# Patient Record
(demographics unavailable — no encounter records)

---

## 2024-10-24 NOTE — HISTORY OF PRESENT ILLNESS
[de-identified] : Mr. Lorenzo is a 74 year old man with multiple myeloma.  He was diagnosed in February 2016 when he had back pain. He was eventually noted in May 2016 to have a large sacral lesion with biopsy demonstrating plasma cell neoplasm. He underwent 20 Gy in 5 fractions to the area in May 2016. He continued to have pain and underwent repeat radiation to 21 Gy in 3 fractions with SBRT completed in August 2016.  He last followed up with us on 11/2/17 reporting continued right lower back pain radiating to the right leg on methadone and hydromorphone. Plan was for repeat lumbosacral MRI and follow-up.  MRI Lumbar spine on 11/14/17 demonstrated mild progression of severe left sided foraminal narrowing at L5-S1 and unchanged appearance of large infiltrative mass within the sacrum. 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  In February he underwent an L2-pelvis posterior spinal fusion with Dr. Alvarez after progressive left lower extremity radiculopathy and he elected for lumbopelvic reconstruction. Pathology of the L5-S1 extradural mass was disc cartilage with mild degenerative changes. He followed up with Dr. Alvarez last on 4/30/18 doing well.  He has been following with Dr. Tan of medical oncology who has been treating her with zometa and maintenance relvimid.  His most recent PET scan on 4/16/18 demonstrates new hypermetabolism associated with pathologic left sacral fracture, a predominantly non-FDG avid sacral lesion unchanges, and nonspecific hypermetabolism associated with new orthopedic hardware in lumbar/sacral spine.  Patient underwent an AutoPSCT on 4/02/19. Engraftment was noted on 4/12/19. Discharged on 4/19/19. Hospital Course: Mr. Lorenzo is a 66 year old male with oligosecretory kappa light chain multiple myeloma initially diagnosed 2/2016, involving a large sacral lesion with biopsy proven plasma cell neoplasm. He underwent radiation with SBRT completed in 8/2016. A subsequent MRI showed mild progression of severe left sided foraminal narrowing at L5-S1 and an unchanged appearance of a large infiltrative mass within the sacrum.   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 In 2/2018 he had an L2 pelvis posterior spimal fusion and elected for lumbopelvic reconstruction. Pathology of the L5-S1 extradural mass was disc cartilage with mild degenerative changes. He then followed with Dr. Tan of medical oncology, and was treated with zometa and maintenance revlimid. A PET scan on 4/16/18 showed a new hypermetabolism associated with a pathologic left sacral fracture, a predominantly  non-FDG avid sacral lesion that was changed, and a nonspecific hypermetabolism associated with new orthopedic hardware in the lumbar / sacral spine. The most recent CT from 12/2018 showed disease in the pelvis related to myeloma.   Labs prior to admission showed hepatitis B core antibody positive, however hepatitis B PCR negative. His most recent SPEP, quant Ig and SFLC were unremarkable. IgG 1140.   Upon admission, a TLC was placed in IR. Mr. Lorenzo received IV hydration, pain management, anxiolysis, antiemetics, nutritional support and antibacterial / antifungal / antiviral / PCP / GI and VOD (SOS) prophylaxis. When his ANC dropped below 1000, he was started on prophylactic ciprofloxacin.   On 4/2/19, after pre-medication, Mr. Lorenzo received 313 mLs of pooled, thawed, mobilized, plasma reduced, washed apheresis HPC over 49 minutes. Cell counts as follows:   Total MNCs ( x 10^8/kg): 3.80  CD 34+Cells ( x 10^6/kg): 7.10  Cell Viability (%): 81  Mr. Lorenzo tolerated the infusion well with no adverse reactions noted.   On admission, Mr. Lorenzo had baseline mild left foot drop. He worked without physical therapy throughout his admission. Mr. Lorenzo also had diarrhea. A sample was sent for c. difficile testing, and was positive. He was treated with a course of oral vancomycin. Mr. Lorenzo also experienced pancytopenia related to the high dose chemotherapy preparative regimen.   Engraftment was noted on 4/12/19, and the ciprofloxacin and zarxio were stopped. He did experience a low grade fever during the katie-engraftment period and was observed off of antibiotics.   Currently, Mr. Lorenzo is stable for discharge home with out patient follow p at the [facility].  [de-identified] : Patient presents today s/p AutoPSCT on 4/02/19. Overall, he is doing well today. He c/o neuropathy in bilateral legs with the right leg having more neuropathy more than the left. He continues to experience a foot drop. and discomfort in left hip  .  PET/CT 9/25/2019, showed continued resolution in the sacral area, lumbar spine and in the pelvis which  indicates continued healing in the bones.  Remains compliant with medications. Denies night sweats, mouth sores, eye dryness, blurred vision, nausea, vomiting, diarrhea. No CP, SOB or LE edema..not on rev   11/30/23:  Patient is here for a follow up visit. Denies fever, chills, nausea, vomiting, diarrhea, rash, mouth sores or any signs of active bleeding. Denies SOB, chest pain or B/L LE edema. Complaints of neuropathy, hasn't improved. Patient follows up with PCP regularly, has to watch his sugar. Takes Metamucil.  3/6/24:  Patient is here for a follow up visit. Denies fever, chills, nausea, vomiting, diarrhea, rash, mouth sores or any signs of active bleeding. Denies SOB, chest pain or B/L LE edema. Complains of neuropathy.    7/25/24: Patient is here for a follow up visit. Denies fever, chills, nausea, vomiting, diarrhea, rash, mouth sores or any signs of active bleeding. Denies SOB, chest pain, or B/L LE edema. Pt states that his radiation went well and was completed on 7/22/24.   10.24.24: Patient is here for a follow up visit. Denies fever, chills, nausea, vomiting, diarrhea, rash, mouth sores or any signs of active bleeding. Denies SOB, chest pain or B/L LE edema. Completed xrt

## 2024-10-24 NOTE — REVIEW OF SYSTEMS
[Joint Pain] : joint pain [Negative] : Allergic/Immunologic [Fatigue] : no fatigue [Cough] : no cough [FreeTextEntry9] : occ muscle cramping [de-identified] : stable neuropathy and numbness in feet, foot drop continues

## 2024-10-24 NOTE — RESULTS/DATA
[FreeTextEntry1] : 04/23/24: PET/CT FDG Whole Body ONC Subsequent Treatment Strategy: IMPRESSION: 1. Since the PET/CT in 9/2019; new FDG avid lytic lesion in the right 5th rib with soft tissue component compatible with active multiple myeloma. 2. Relatively stable chronic bone changes in the sacrum and adjacent bilateral iliac bones with nonspecific patches of activity most likely due to continuing bone remodeling.

## 2024-10-24 NOTE — ASSESSMENT
[FreeTextEntry1] : DVT (deep venous thrombosis) (453.40) (I82.409) History of autologous stem cell transplant (V42.82) (Z94.84) Multiple myeloma (203.00) (C90.00) This is a 74 year old male with PMH of oligo-secretory kappa light chain multiple myeloma, s/p AUTO PBSCT on 4/2/19 with the following comorbidities being managed  1) MM S/p auto SCT on 4/2/19..appeared to have good disease control Repeated myeloma labs approx every 4 mos after SCT (Sept 2019) Restaging PET scan to be done approx 4 to 6 mos after SCT done Sept 2019..confirmed improvement..repeated late winter....overall good disease control..not on maintenance due to sxs...skeletal survey ordered 8/4/22..stable Repeat CT/PET due Spring / summer 2024...4/23/24 results compatible with active MM...local radiation completed 7/22/24 f/u neuro and ortho for low back issues....??role for kyphoplasty 11/2023 24 hr urine-stable..repeat today  2) Heme Counts stable, no indication for transfusion Continue multivitamin daily. Discontinue folic acid as of 7/16/19. 10/24/24: WBC 3.37, HGB 12.7,   3) ID - Acyclovir 400mg q8hr for shingles prevention..off - Mepron 750mg bid- discontinued Fluconazole discontinued. Reviewed infectious risk and post-transplant crowd and dietary restrictions. All restrictions lifted on 5/31/19. Covid caution stressed  Hx of C Diff infection during transplant admission Diarrhea resolved PO Vanco 125mg decreased to bid on 5/2/19, then discontinued.  Intermittent dysuria 5/2/19 UA negative Urine cx negative  4) GI Continue Protonix 40mg daily PRN Reglan and Zofran as needed  Transaminitis First noted 4/25/19 with AST of 89 and ALT of 249 Fluconazole- d/c'd 4/26 with transaminitis Improved as of 5/2/19, continue to monitor Restarted Fluconazole....now discontinue once current supply complete. Discontinued.  Discussed re-vaccination schedule. Starting april 2020..postponed due to crisis with covid 19..plan for august 12 month vaccines (hemophilus influenza B, Prevnar 13, inactivated polio, tetanus-diphtheria) 14 month vaccines (hemophilus influenza B, inactivated polio, tetanus-diphtheria) oct 2020 24 month vaccines (hemophilus influenza B, pneumococcal vaccine, inactivated polio, tetanus-diphtheria, measles, mumps, rubella). completed  5) Plan/Dispo Reviewed infectious risk and post-transplant crowd and dietary restrictions with covid. Continue to monitor IgG levels  maintenance again discussed...will hold off until neuropathic sx improve..otc topicals discussed  Also discussed potential treatment if POD...natural hx of MM discussed Pt educated regarding plan of care, all questions/concerns addressed. Instructed to contact our office with fever or new/worsening symptoms.  Recommend voltaren cream in area of pain on hip Well care and cancer screening stressed Chest CT mid-October 2024 with improvement in radiated lesion.... 24 hr urine brought by pt today Discussed consideration of Darzalex and Revlimid if pt does not respond to local radiation and requires systemic treatment Discussed beginning 5-10mg dosage of Revlimid for maintenance   Discussed consideration of repeat BMB  Discussed bispecific antibody treatment if the myeloma progresses  pt received covid vaccine X 3 moderna...and flu shot last year..flu advised today as well as covid booster  Follow up with Dr. Mtz in 3 months

## 2024-10-24 NOTE — PHYSICAL EXAM
[Restricted in physically strenuous activity but ambulatory and able to carry out work of a light or sedentary nature] : Status 1- Restricted in physically strenuous activity but ambulatory and able to carry out work of a light or sedentary nature, e.g., light house work, office work [Normal] : affect appropriate [de-identified] : lungs clear to auscultation bilaterally upon exam  [de-identified] : generalized hyperpigmentation improved

## 2024-10-24 NOTE — ADDENDUM
[FreeTextEntry1] : Documented by Brown London acting as a scribe for Dr. Bhavani Mtz on 10/24/24. All medical record entries made by the Scribe were at my, Dr. Bhavani Mtz, direction and personally dictated by me on 10/24/24. I have reviewed the chart and agree that the record accurately reflects my personal performance of the history, physical exam, assessment and plan. I have also personally directed, reviewed, and agree with the discharge instructions.

## 2025-01-13 NOTE — HISTORY OF PRESENT ILLNESS
[FreeTextEntry1] : Mr. Lorenzo presents today for consideration for radiation therapy.  He is a 74 year old man with multiple myeloma diagnosed initially in 2016 after presenting with back pain.   CT abdomen and pelvis done 5/3/2016 showed a large destructive sacral soft tissue mass 6.5 X 10.5 X 8cm. A sacral FNA on 5/9/2016 showed morphologic findings and presence of a bright CD38 population support a plasma cell neoplasm.   Bone marrow biopsy 5/11/2016 showed atypical plasmacytosis consistent with plasma cell neoplasm. He completed 2000 cgy in 5 fractions to the sacrum under my care at from 5/12-5/18/2016.  He continued to have pain and completed repeat RT with SBRT to the sacrum, 2100 cgy in 3 fractions on 8/4/2016 He is s/p L2-pelvis posterior spinal fusion with Dr. Alvarez after progressive LLE radiculopathy. Path from L5-s1 extradural mass showed disc cartilage with mild degenerative changes.  Initially treated with Dr. Tan with revlemid starting 12/2016. Treated with Auto PSCT on 4/2/2019 with engraftment on 4/12/2019.  PET scan done 4/23/2024 showed: 1. Since the PET/CT in 9/2019; new FDG avid lytic lesion in the right 5th rib with soft tissue component compatible with active multiple myeloma. 2. Relatively stable chronic bone changes in the sacrum and adjacent bilateral iliac bones with nonspecific patches of activity most likely due to continuing bone remodeling.  Pathology from 5/22/2024 FNA of the right rib showed plasma cell neoplasm. Note: The current biopsy of the right rib lesion shows involvement by plasma cell neoplasm and is consistent with relapse. Suggest clinical correlation.  7/22/2024 Completed RT to chest wall total dose of 2500cGy in 5/5 fractions  9/19/24 CT Chest IMPRESSION: Right anterior fifth rib lytic lesion with soft tissue component, decreased since 4/23/2024. Stable rounded lucent lesions throughout the thoracic spine. Stable bilateral pulmonary nodules.  1/02/25 CT Chest IMPRESSION: Lucent lesion within the right fifth rib with slight interval decrease in size of its internal soft tissue component since September 19, 2024. 1.4 cm enlarged right internal mammary chain lymph node with significant increase in size since September 19, 2024. 1 cm anterior mediastinal soft tissue nodule with interval increase in size since September 19, 2024. Findings are likely due to progression of neoplasm. PET CT can be performed for further evaluation. Small patchy opacity within the right lower lobe new since September 19, 2024, may represent subsegmental atelectasis or be sequela of the given history of radiation therapy however, follow-up to resolution is recommended. Cluster of a few adjacent subcentimeter groundglass opacities within the right upper lobe new since September 19, 2024 likely of impacted distal airways may represent tiny foci of infection/inflammation. These can be monitored on the follow-up chest CTs.  1/13/25 Presents today for follow up. Denies fever, chills, nausea, vomiting, diarrhea, rash, mouth sores or any signs of active bleeding. Denies SOB, chest pain or B/L LE edema.  Continues follow up with Med/Onc Dr. Mtz on 10/24/24

## 2025-01-13 NOTE — DISEASE MANAGEMENT
[Clinical] : TNM Stage: c [N/A] : Currently not applicable [de-identified] : 4895 [de-identified] : 0697 [de-identified] : Chest wall [TTNM] : x [NTNM] : x [MTNM] : x

## 2025-01-13 NOTE — REVIEW OF SYSTEMS
[Negative] : Psychiatric [FreeTextEntry9] : notes pain to left hip since myeloma diagnosis. Denies pain to chest

## 2025-01-13 NOTE — DISEASE MANAGEMENT
[Clinical] : TNM Stage: c [N/A] : Currently not applicable [de-identified] : 4218 [de-identified] : 8476 [de-identified] : Chest wall [TTNM] : x [NTNM] : x [MTNM] : x

## 2025-02-12 NOTE — ASSESSMENT
[FreeTextEntry1] : DVT (deep venous thrombosis) (453.40) (I82.409) History of autologous stem cell transplant (V42.82) (Z94.84) Multiple myeloma (203.00) (C90.00) This is a 74 year old male with PMH of oligo-secretory kappa light chain multiple myeloma, s/p AUTO PBSCT on 4/2/19 with the following comorbidities being managed  1) MM S/p auto SCT on 4/2/19..appeared to have good disease control Repeated myeloma labs approx every 4 mos after SCT (Sept 2019) Restaging PET scan to be done approx 4 to 6 mos after SCT done Sept 2019..confirmed improvement..repeated late winter....overall good disease control..not on maintenance due to sxs...skeletal survey ordered 8/4/22..stable Repeat CT/PET due Spring / summer 2024...4/23/24 results compatible with active MM...local radiation completed 7/22/24..2/2025 ct/pet new lesion..discussed bx f/u neuro and ortho for low back issues....??role for kyphoplasty   2) Heme Counts stable, no indication for transfusion Continue multivitamin daily. Discontinue folic acid as of 7/16/19. 10/24/24: WBC 3.37, HGB 12.7,  02/12/25: WBC 3.10, HGB 12.5,   3) ID - Acyclovir 400mg q8hr for shingles prevention..off - Mepron 750mg bid- discontinued Fluconazole discontinued. Reviewed infectious risk and post-transplant crowd and dietary restrictions. All restrictions lifted on 5/31/19. Covid caution stressed  Hx of C Diff infection during transplant admission Diarrhea resolved PO Vanco 125mg decreased to bid on 5/2/19, then discontinued.  Intermittent dysuria 5/2/19 UA negative Urine cx negative  4) GI Continue Protonix 40mg daily PRN Reglan and Zofran as needed  Transaminitis First noted 4/25/19 with AST of 89 and ALT of 249 Fluconazole- d/c'd 4/26 with transaminitis Improved as of 5/2/19, continue to monitor Restarted Fluconazole....now discontinue once current supply complete. Discontinued.  Discussed re-vaccination schedule. Starting april 2020..postponed due to crisis with covid 19..plan for august 12 month vaccines (hemophilus influenza B, Prevnar 13, inactivated polio, tetanus-diphtheria) 14 month vaccines (hemophilus influenza B, inactivated polio, tetanus-diphtheria) oct 2020 24 month vaccines (hemophilus influenza B, pneumococcal vaccine, inactivated polio, tetanus-diphtheria, measles, mumps, rubella). completed  5) Plan/Dispo Reviewed infectious risk and post-transplant crowd and dietary restrictions with covid. Continue to monitor IgG levels  maintenance again discussed...will hold off until neuropathic sx improve..otc topicals discussed  Also discussed potential treatment if POD...natural hx of MM discussed Pt educated regarding plan of care, all questions/concerns addressed. Instructed to contact our office with fever or new/worsening symptoms.  Recommend voltaren cream in area of pain on hip Well care and cancer screening stressed Chest CT mid-October 2024 with improvement in radiated lesion.... ct/pet feb 2025 new lesion Discussed consideration of Darzalex and Revlimid if pt does not respond to local radiation and requires systemic treatment Discussed consideration of repeat BMB  Discussed bispecific antibody treatment if the myeloma progresses  pt received covid vaccine X 3 moderna...and flu shot last year..flu advised today as well as covid booster Discussed his most recent PET/CT results that noted a hypermetabolic  lymph node measuring 1.7cm suv 21 in the chest that raises a concern... discussed a needle biopsy to further assess   Follow up with Dr. Mtz in 4 weeks

## 2025-02-12 NOTE — PHYSICAL EXAM
[Restricted in physically strenuous activity but ambulatory and able to carry out work of a light or sedentary nature] : Status 1- Restricted in physically strenuous activity but ambulatory and able to carry out work of a light or sedentary nature, e.g., light house work, office work [Normal] : affect appropriate [de-identified] : lungs clear to auscultation bilaterally upon exam  [de-identified] : generalized hyperpigmentation improved

## 2025-02-12 NOTE — HISTORY OF PRESENT ILLNESS
[de-identified] : Mr. Lorenzo is a 74 year old man with multiple myeloma.  He was diagnosed in February 2016 when he had back pain. He was eventually noted in May 2016 to have a large sacral lesion with biopsy demonstrating plasma cell neoplasm. He underwent 20 Gy in 5 fractions to the area in May 2016. He continued to have pain and underwent repeat radiation to 21 Gy in 3 fractions with SBRT completed in August 2016.  He last followed up with us on 11/2/17 reporting continued right lower back pain radiating to the right leg on methadone and hydromorphone. Plan was for repeat lumbosacral MRI and follow-up.  MRI Lumbar spine on 11/14/17 demonstrated mild progression of severe left sided foraminal narrowing at L5-S1 and unchanged appearance of large infiltrative mass within the sacrum. 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  In February he underwent an L2-pelvis posterior spinal fusion with Dr. Alvarez after progressive left lower extremity radiculopathy and he elected for lumbopelvic reconstruction. Pathology of the L5-S1 extradural mass was disc cartilage with mild degenerative changes. He followed up with Dr. Alvarez last on 4/30/18 doing well.  He has been following with Dr. Tan of medical oncology who has been treating her with zometa and maintenance relvimid.  His most recent PET scan on 4/16/18 demonstrates new hypermetabolism associated with pathologic left sacral fracture, a predominantly non-FDG avid sacral lesion unchanges, and nonspecific hypermetabolism associated with new orthopedic hardware in lumbar/sacral spine.  Patient underwent an AutoPSCT on 4/02/19. Engraftment was noted on 4/12/19. Discharged on 4/19/19. Hospital Course: Mr. Lorenzo is a 66 year old male with oligosecretory kappa light chain multiple myeloma initially diagnosed 2/2016, involving a large sacral lesion with biopsy proven plasma cell neoplasm. He underwent radiation with SBRT completed in 8/2016. A subsequent MRI showed mild progression of severe left sided foraminal narrowing at L5-S1 and an unchanged appearance of a large infiltrative mass within the sacrum.   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 In 2/2018 he had an L2 pelvis posterior spimal fusion and elected for lumbopelvic reconstruction. Pathology of the L5-S1 extradural mass was disc cartilage with mild degenerative changes. He then followed with Dr. Tan of medical oncology, and was treated with zometa and maintenance revlimid. A PET scan on 4/16/18 showed a new hypermetabolism associated with a pathologic left sacral fracture, a predominantly  non-FDG avid sacral lesion that was changed, and a nonspecific hypermetabolism associated with new orthopedic hardware in the lumbar / sacral spine. The most recent CT from 12/2018 showed disease in the pelvis related to myeloma.   Labs prior to admission showed hepatitis B core antibody positive, however hepatitis B PCR negative. His most recent SPEP, quant Ig and SFLC were unremarkable. IgG 1140.   Upon admission, a TLC was placed in IR. Mr. Lorenzo received IV hydration, pain management, anxiolysis, antiemetics, nutritional support and antibacterial / antifungal / antiviral / PCP / GI and VOD (SOS) prophylaxis. When his ANC dropped below 1000, he was started on prophylactic ciprofloxacin.   On 4/2/19, after pre-medication, Mr. Lorenzo received 313 mLs of pooled, thawed, mobilized, plasma reduced, washed apheresis HPC over 49 minutes. Cell counts as follows:   Total MNCs ( x 10^8/kg): 3.80  CD 34+Cells ( x 10^6/kg): 7.10  Cell Viability (%): 81  Mr. Lorenzo tolerated the infusion well with no adverse reactions noted.   On admission, Mr. Lorenzo had baseline mild left foot drop. He worked without physical therapy throughout his admission. Mr. Lorenzo also had diarrhea. A sample was sent for c. difficile testing, and was positive. He was treated with a course of oral vancomycin. Mr. Lorenzo also experienced pancytopenia related to the high dose chemotherapy preparative regimen.   Engraftment was noted on 4/12/19, and the ciprofloxacin and zarxio were stopped. He did experience a low grade fever during the katie-engraftment period and was observed off of antibiotics.   Currently, Mr. Lorenzo is stable for discharge home with out patient follow p at the [facility].  [de-identified] : Patient presents today s/p AutoPSCT on 4/02/19. Overall, he is doing well today. He c/o neuropathy in bilateral legs with the right leg having more neuropathy more than the left. He continues to experience a foot drop. and discomfort in left hip  .  PET/CT 9/25/2019, showed continued resolution in the sacral area, lumbar spine and in the pelvis which  indicates continued healing in the bones.  Remains compliant with medications. Denies night sweats, mouth sores, eye dryness, blurred vision, nausea, vomiting, diarrhea. No CP, SOB or LE edema..not on rev   11/30/23:  Patient is here for a follow up visit. Denies fever, chills, nausea, vomiting, diarrhea, rash, mouth sores or any signs of active bleeding. Denies SOB, chest pain or B/L LE edema. Complaints of neuropathy, hasn't improved. Patient follows up with PCP regularly, has to watch his sugar. Takes Metamucil.  3/6/24:  Patient is here for a follow up visit. Denies fever, chills, nausea, vomiting, diarrhea, rash, mouth sores or any signs of active bleeding. Denies SOB, chest pain or B/L LE edema. Complains of neuropathy.    7/25/24: Patient is here for a follow up visit. Denies fever, chills, nausea, vomiting, diarrhea, rash, mouth sores or any signs of active bleeding. Denies SOB, chest pain, or B/L LE edema. Pt states that his radiation went well and was completed on 7/22/24.   10.24.24: Patient is here for a follow up visit. Denies fever, chills, nausea, vomiting, diarrhea, rash, mouth sores or any signs of active bleeding. Denies SOB, chest pain or B/L LE edema. Completed xrt  02.12.25: Patient is here for a follow up visit. Denies fever, chills, nausea, vomiting, diarrhea, rash, mouth sores or any signs of active bleeding. Denies SOB, chest pain or B/L LE edema. did ct/pet last week

## 2025-02-12 NOTE — REVIEW OF SYSTEMS
[Joint Pain] : joint pain [Negative] : Allergic/Immunologic [Fatigue] : no fatigue [Cough] : no cough [FreeTextEntry9] : occ muscle cramping [de-identified] : stable neuropathy and numbness in feet, foot drop continues

## 2025-02-12 NOTE — ADDENDUM
[FreeTextEntry1] : Documented by Brown London acting as a scribe for Dr. Bhavani Mtz on 02/12/2025. All medical record entries made by the Scribe were at my, Dr. Bhavani Mtz, direction and personally dictated by me on 02/12/2025. I have reviewed the chart and agree that the record accurately reflects my personal performance of the history, physical exam, assessment and plan. I have also personally directed, reviewed, and agree with the discharge instructions.

## 2025-03-21 NOTE — PHYSICAL EXAM
[Restricted in physically strenuous activity but ambulatory and able to carry out work of a light or sedentary nature] : Status 1- Restricted in physically strenuous activity but ambulatory and able to carry out work of a light or sedentary nature, e.g., light house work, office work [Normal] : affect appropriate [de-identified] : lungs clear to auscultation bilaterally upon exam  [de-identified] : generalized hyperpigmentation improved

## 2025-03-21 NOTE — ADDENDUM
[FreeTextEntry1] : Documented by Brown London acting as a scribe for Dr. Bhavani Mtz on 03/19/2025. All medical record entries made by the Scribe were at my, Dr. Bhavani Mtz, direction and personally dictated by me on 03/19/2025. I have reviewed the chart and agree that the record accurately reflects my personal performance of the history, physical exam, assessment and plan. I have also personally directed, reviewed, and agree with the discharge instructions.

## 2025-03-21 NOTE — REVIEW OF SYSTEMS
[Joint Pain] : joint pain [Negative] : Allergic/Immunologic [Fatigue] : no fatigue [Cough] : no cough [FreeTextEntry9] : occ muscle cramping [de-identified] : stable neuropathy and numbness in feet, foot drop continues

## 2025-03-21 NOTE — HISTORY OF PRESENT ILLNESS
[de-identified] : Mr. Lorenzo is a 74 year old man with multiple myeloma.  He was diagnosed in February 2016 when he had back pain. He was eventually noted in May 2016 to have a large sacral lesion with biopsy demonstrating plasma cell neoplasm. He underwent 20 Gy in 5 fractions to the area in May 2016. He continued to have pain and underwent repeat radiation to 21 Gy in 3 fractions with SBRT completed in August 2016.  He last followed up with us on 11/2/17 reporting continued right lower back pain radiating to the right leg on methadone and hydromorphone. Plan was for repeat lumbosacral MRI and follow-up.  MRI Lumbar spine on 11/14/17 demonstrated mild progression of severe left sided foraminal narrowing at L5-S1 and unchanged appearance of large infiltrative mass within the sacrum. 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  In February he underwent an L2-pelvis posterior spinal fusion with Dr. Alvarez after progressive left lower extremity radiculopathy and he elected for lumbopelvic reconstruction. Pathology of the L5-S1 extradural mass was disc cartilage with mild degenerative changes. He followed up with Dr. Alvarez last on 4/30/18 doing well.  He has been following with Dr. Tan of medical oncology who has been treating her with zometa and maintenance relvimid.  His most recent PET scan on 4/16/18 demonstrates new hypermetabolism associated with pathologic left sacral fracture, a predominantly non-FDG avid sacral lesion unchanges, and nonspecific hypermetabolism associated with new orthopedic hardware in lumbar/sacral spine.  Patient underwent an AutoPSCT on 4/02/19. Engraftment was noted on 4/12/19. Discharged on 4/19/19. Hospital Course: Mr. Lorenzo is a 66 year old male with oligosecretory kappa light chain multiple myeloma initially diagnosed 2/2016, involving a large sacral lesion with biopsy proven plasma cell neoplasm. He underwent radiation with SBRT completed in 8/2016. A subsequent MRI showed mild progression of severe left sided foraminal narrowing at L5-S1 and an unchanged appearance of a large infiltrative mass within the sacrum.   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 In 2/2018 he had an L2 pelvis posterior spimal fusion and elected for lumbopelvic reconstruction. Pathology of the L5-S1 extradural mass was disc cartilage with mild degenerative changes. He then followed with Dr. Tan of medical oncology, and was treated with zometa and maintenance revlimid. A PET scan on 4/16/18 showed a new hypermetabolism associated with a pathologic left sacral fracture, a predominantly  non-FDG avid sacral lesion that was changed, and a nonspecific hypermetabolism associated with new orthopedic hardware in the lumbar / sacral spine. The most recent CT from 12/2018 showed disease in the pelvis related to myeloma.   Labs prior to admission showed hepatitis B core antibody positive, however hepatitis B PCR negative. His most recent SPEP, quant Ig and SFLC were unremarkable. IgG 1140.   Upon admission, a TLC was placed in IR. Mr. Lorenzo received IV hydration, pain management, anxiolysis, antiemetics, nutritional support and antibacterial / antifungal / antiviral / PCP / GI and VOD (SOS) prophylaxis. When his ANC dropped below 1000, he was started on prophylactic ciprofloxacin.   On 4/2/19, after pre-medication, Mr. Lorenzo received 313 mLs of pooled, thawed, mobilized, plasma reduced, washed apheresis HPC over 49 minutes. Cell counts as follows:   Total MNCs ( x 10^8/kg): 3.80  CD 34+Cells ( x 10^6/kg): 7.10  Cell Viability (%): 81  Mr. Lorenzo tolerated the infusion well with no adverse reactions noted.   On admission, Mr. Lorenzo had baseline mild left foot drop. He worked without physical therapy throughout his admission. Mr. Lorenzo also had diarrhea. A sample was sent for c. difficile testing, and was positive. He was treated with a course of oral vancomycin. Mr. Lorenzo also experienced pancytopenia related to the high dose chemotherapy preparative regimen.   Engraftment was noted on 4/12/19, and the ciprofloxacin and zarxio were stopped. He did experience a low grade fever during the katie-engraftment period and was observed off of antibiotics.   Currently, Mr. Lorenzo is stable for discharge home with [facility] patient [facility].  [de-identified] : Patient presents today s/p AutoPSCT on 4/02/19. Overall, he is doing well today. He c/o neuropathy in bilateral legs with the right leg having more neuropathy more than the left. He continues to experience a foot drop. and discomfort in left hip  .  PET/CT 9/25/2019, showed continued resolution in the sacral area, lumbar spine and in the pelvis which  indicates continued healing in the bones.  Remains compliant with medications. Denies night sweats, mouth sores, eye dryness, blurred vision, nausea, vomiting, diarrhea. No CP, SOB or LE edema..not on rev   11/30/23:  Patient is here for a follow up visit. Denies fever, chills, nausea, vomiting, diarrhea, rash, mouth sores or any signs of active bleeding. Denies SOB, chest pain or B/L LE edema. Complaints of neuropathy, hasn't improved. Patient follows up with PCP regularly, has to watch his sugar. Takes Metamucil.  3/6/24:  Patient is here for a follow up visit. Denies fever, chills, nausea, vomiting, diarrhea, rash, mouth sores or any signs of active bleeding. Denies SOB, chest pain or B/L LE edema. Complains of neuropathy.    7/25/24: Patient is here for a follow up visit. Denies fever, chills, nausea, vomiting, diarrhea, rash, mouth sores or any signs of active bleeding. Denies SOB, chest pain, or B/L LE edema. Pt states that his radiation went well and was completed on 7/22/24.   10.24.24: Patient is here for a follow up visit. Denies fever, chills, nausea, vomiting, diarrhea, rash, mouth sores or any signs of active bleeding. Denies SOB, chest pain or B/L LE edema. Completed xrt  02.12.25: Patient is here for a follow up visit. Denies fever, chills, nausea, vomiting, diarrhea, rash, mouth sores or any signs of active bleeding. Denies SOB, chest pain or B/L LE edema. did ct/pet last week  03.19.25:  Patient is here for a follow up visit. Denies fever, chills, nausea, vomiting, diarrhea, rash, mouth sores or any signs of active bleeding. Denies SOB, chest pain or B/L LE edema. He complains of an unsteady gait due to issues in his left hip. He was doing physical therapy but he did not feel it was helpful.

## 2025-03-21 NOTE — PHYSICAL EXAM
[Restricted in physically strenuous activity but ambulatory and able to carry out work of a light or sedentary nature] : Status 1- Restricted in physically strenuous activity but ambulatory and able to carry out work of a light or sedentary nature, e.g., light house work, office work [Normal] : affect appropriate [de-identified] : lungs clear to auscultation bilaterally upon exam  [de-identified] : generalized hyperpigmentation improved

## 2025-03-21 NOTE — ASSESSMENT
[FreeTextEntry1] : DVT (deep venous thrombosis) (453.40) (I82.409) History of autologous stem cell transplant (V42.82) (Z94.84) Multiple myeloma (203.00) (C90.00) This is a 74 year old male with PMH of oligo-secretory kappa light chain multiple myeloma, s/p AUTO PBSCT on 4/2/19 with the following comorbidities being managed  1) MM S/p auto SCT on 4/2/19..appeared to have good disease control Repeated myeloma labs approx every 4 mos after SCT (Sept 2019) Restaging PET scan to be done approx 4 to 6 mos after SCT done Sept 2019..confirmed improvement..repeated late winter....overall good disease control..not on maintenance due to sxs...skeletal survey ordered 8/4/22..stable Repeat CT/PET due Spring / summer 2024...4/23/24 results compatible with active MM...local radiation completed 7/22/24..2/2025 ct/pet new lesion..discussed bx..confirmed plasma cells / disease f/u neuro and ortho for low back issues....??role for kyphoplasty   2) Heme Counts stable, no indication for transfusion Continue multivitamin daily. Discontinue folic acid as of 7/16/19. 10/24/24: WBC 3.37, HGB 12.7,  02/12/25: WBC 3.10, HGB 12.5,  03/18/25: WBC 3.28, HGB 12.7,   3) ID - Acyclovir 400mg q8hr for shingles prevention..off..restart - Mepron 750mg bid- discontinued Fluconazole discontinued. Reviewed infectious risk and post-transplant crowd and dietary restrictions. All restrictions lifted on 5/31/19. Covid caution stressed  Hx of C Diff infection during transplant admission Diarrhea resolved PO Vanco 125mg decreased to bid on 5/2/19, then discontinued.  Intermittent dysuria 5/2/19 UA negative Urine cx negative  4) GI Continue Protonix 40mg daily PRN Reglan and Zofran as needed  Transaminitis First noted 4/25/19 with AST of 89 and ALT of 249 Fluconazole- d/c'd 4/26 with transaminitis Improved as of 5/2/19, continue to monitor Restarted Fluconazole....now discontinue once current supply complete. Discontinued.  Discussed re-vaccination schedule. Starting april 2020..postponed due to crisis with covid 19..plan for august 12 month vaccines (hemophilus influenza B, Prevnar 13, inactivated polio, tetanus-diphtheria) 14 month vaccines (hemophilus influenza B, inactivated polio, tetanus-diphtheria) oct 2020 24 month vaccines (hemophilus influenza B, pneumococcal vaccine, inactivated polio, tetanus-diphtheria, measles, mumps, rubella). completed  5) Plan/Dispo Reviewed infectious risk and post-transplant crowd and dietary restrictions with covid. Continue to monitor IgG levels  maintenance again discussed...will hold off until neuropathic sx improve..otc topicals discussed  Also discussed potential treatment if POD...natural hx of MM discussed Pt educated regarding plan of care, all questions/concerns addressed. Instructed to contact our office with fever or new/worsening symptoms.  Recommend voltaren cream in area of pain on hip Well care and cancer screening stressed Chest CT mid-October 2024 with improvement in radiated lesion.... ct/pet feb 2025 new lesion..bx confirmed disease Discussed consideration of Darzalex and Revlimid  or pom if pt does not respond to local radiation and requires systemic treatment... Discussed consideration of repeat BMB  Discussed bispecific antibody treatment if the myeloma progresses... Darzalex weekly for 8 weeks, every other weeks for 8 treatments and then monthly with Dexamethasone... discussed addition of Pomalyst as well if needed  pt received covid vaccine X 3 moderna...and flu shot last year..flu advised today as well as covid booster Discussed his most recent PET/CT results that noted a hypermetabolic  lymph node measuring 1.7cm suv 21 in the chest that raises a concern... discussed a needle biopsy to further assess ..disease confirmed..d/w pt need for systemic therapy...will proceed with bea dex with plan to add pom in future..consent signed..quests addressed  Follow up with Dr. Mtz in 4 weeks

## 2025-03-21 NOTE — HISTORY OF PRESENT ILLNESS
[de-identified] : Mr. Lorenzo is a 74 year old man with multiple myeloma.  He was diagnosed in February 2016 when he had back pain. He was eventually noted in May 2016 to have a large sacral lesion with biopsy demonstrating plasma cell neoplasm. He underwent 20 Gy in 5 fractions to the area in May 2016. He continued to have pain and underwent repeat radiation to 21 Gy in 3 fractions with SBRT completed in August 2016.  He last followed up with us on 11/2/17 reporting continued right lower back pain radiating to the right leg on methadone and hydromorphone. Plan was for repeat lumbosacral MRI and follow-up.  MRI Lumbar spine on 11/14/17 demonstrated mild progression of severe left sided foraminal narrowing at L5-S1 and unchanged appearance of large infiltrative mass within the sacrum. 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  In February he underwent an L2-pelvis posterior spinal fusion with Dr. Alvarez after progressive left lower extremity radiculopathy and he elected for lumbopelvic reconstruction. Pathology of the L5-S1 extradural mass was disc cartilage with mild degenerative changes. He followed up with Dr. Alvarez last on 4/30/18 doing well.  He has been following with Dr. Tan of medical oncology who has been treating her with zometa and maintenance relvimid.  His most recent PET scan on 4/16/18 demonstrates new hypermetabolism associated with pathologic left sacral fracture, a predominantly non-FDG avid sacral lesion unchanges, and nonspecific hypermetabolism associated with new orthopedic hardware in lumbar/sacral spine.  Patient underwent an AutoPSCT on 4/02/19. Engraftment was noted on 4/12/19. Discharged on 4/19/19. Hospital Course: Mr. Lorenzo is a 66 year old male with oligosecretory kappa light chain multiple myeloma initially diagnosed 2/2016, involving a large sacral lesion with biopsy proven plasma cell neoplasm. He underwent radiation with SBRT completed in 8/2016. A subsequent MRI showed mild progression of severe left sided foraminal narrowing at L5-S1 and an unchanged appearance of a large infiltrative mass within the sacrum.   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 In 2/2018 he had an L2 pelvis posterior spimal fusion and elected for lumbopelvic reconstruction. Pathology of the L5-S1 extradural mass was disc cartilage with mild degenerative changes. He then followed with Dr. Tan of medical oncology, and was treated with zometa and maintenance revlimid. A PET scan on 4/16/18 showed a new hypermetabolism associated with a pathologic left sacral fracture, a predominantly  non-FDG avid sacral lesion that was changed, and a nonspecific hypermetabolism associated with new orthopedic hardware in the lumbar / sacral spine. The most recent CT from 12/2018 showed disease in the pelvis related to myeloma.   Labs prior to admission showed hepatitis B core antibody positive, however hepatitis B PCR negative. His most recent SPEP, quant Ig and SFLC were unremarkable. IgG 1140.   Upon admission, a TLC was placed in IR. Mr. Lorenzo received IV hydration, pain management, anxiolysis, antiemetics, nutritional support and antibacterial / antifungal / antiviral / PCP / GI and VOD (SOS) prophylaxis. When his ANC dropped below 1000, he was started on prophylactic ciprofloxacin.   On 4/2/19, after pre-medication, Mr. Lorenzo received 313 mLs of pooled, thawed, mobilized, plasma reduced, washed apheresis HPC over 49 minutes. Cell counts as follows:   Total MNCs ( x 10^8/kg): 3.80  CD 34+Cells ( x 10^6/kg): 7.10  Cell Viability (%): 81  Mr. Lorenzo tolerated the infusion well with no adverse reactions noted.   On admission, Mr. Lorenzo had baseline mild left foot drop. He worked without physical therapy throughout his admission. Mr. Lorenzo also had diarrhea. A sample was sent for c. difficile testing, and was positive. He was treated with a course of oral vancomycin. Mr. Lorenzo also experienced pancytopenia related to the high dose chemotherapy preparative regimen.   Engraftment was noted on 4/12/19, and the ciprofloxacin and zarxio were stopped. He did experience a low grade fever during the katie-engraftment period and was observed off of antibiotics.   Currently, Mr. Lorenzo is stable for discharge home with out patient follow p at the [facility].  [de-identified] : Patient presents today s/p AutoPSCT on 4/02/19. Overall, he is doing well today. He c/o neuropathy in bilateral legs with the right leg having more neuropathy more than the left. He continues to experience a foot drop. and discomfort in left hip  .  PET/CT 9/25/2019, showed continued resolution in the sacral area, lumbar spine and in the pelvis which  indicates continued healing in the bones.  Remains compliant with medications. Denies night sweats, mouth sores, eye dryness, blurred vision, nausea, vomiting, diarrhea. No CP, SOB or LE edema..not on rev   11/30/23:  Patient is here for a follow up visit. Denies fever, chills, nausea, vomiting, diarrhea, rash, mouth sores or any signs of active bleeding. Denies SOB, chest pain or B/L LE edema. Complaints of neuropathy, hasn't improved. Patient follows up with PCP regularly, has to watch his sugar. Takes Metamucil.  3/6/24:  Patient is here for a follow up visit. Denies fever, chills, nausea, vomiting, diarrhea, rash, mouth sores or any signs of active bleeding. Denies SOB, chest pain or B/L LE edema. Complains of neuropathy.    7/25/24: Patient is here for a follow up visit. Denies fever, chills, nausea, vomiting, diarrhea, rash, mouth sores or any signs of active bleeding. Denies SOB, chest pain, or B/L LE edema. Pt states that his radiation went well and was completed on 7/22/24.   10.24.24: Patient is here for a follow up visit. Denies fever, chills, nausea, vomiting, diarrhea, rash, mouth sores or any signs of active bleeding. Denies SOB, chest pain or B/L LE edema. Completed xrt  02.12.25: Patient is here for a follow up visit. Denies fever, chills, nausea, vomiting, diarrhea, rash, mouth sores or any signs of active bleeding. Denies SOB, chest pain or B/L LE edema. did ct/pet last week  03.19.25:  Patient is here for a follow up visit. Denies fever, chills, nausea, vomiting, diarrhea, rash, mouth sores or any signs of active bleeding. Denies SOB, chest pain or B/L LE edema. He complains of an unsteady gait due to issues in his left hip. He was doing physical therapy but he did not feel it was helpful.

## 2025-03-21 NOTE — REVIEW OF SYSTEMS
[Joint Pain] : joint pain [Negative] : Allergic/Immunologic [Fatigue] : no fatigue [Cough] : no cough [FreeTextEntry9] : occ muscle cramping [de-identified] : stable neuropathy and numbness in feet, foot drop continues

## 2025-04-18 NOTE — HISTORY OF PRESENT ILLNESS
[de-identified] : Mr. Lorenzo is a 74 year old man with multiple myeloma.  He was diagnosed in February 2016 when he had back pain. He was eventually noted in May 2016 to have a large sacral lesion with biopsy demonstrating plasma cell neoplasm. He underwent 20 Gy in 5 fractions to the area in May 2016. He continued to have pain and underwent repeat radiation to 21 Gy in 3 fractions with SBRT completed in August 2016.  He last followed up with us on 11/2/17 reporting continued right lower back pain radiating to the right leg on methadone and hydromorphone. Plan was for repeat lumbosacral MRI and follow-up.  MRI Lumbar spine on 11/14/17 demonstrated mild progression of severe left sided foraminal narrowing at L5-S1 and unchanged appearance of large infiltrative mass within the sacrum. 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  In February he underwent an L2-pelvis posterior spinal fusion with Dr. Alvarez after progressive left lower extremity radiculopathy and he elected for lumbopelvic reconstruction. Pathology of the L5-S1 extradural mass was disc cartilage with mild degenerative changes. He followed up with Dr. Alvarez last on 4/30/18 doing well.  He has been following with Dr. Tan of medical oncology who has been treating her with zometa and maintenance relvimid.  His most recent PET scan on 4/16/18 demonstrates new hypermetabolism associated with pathologic left sacral fracture, a predominantly non-FDG avid sacral lesion unchanges, and nonspecific hypermetabolism associated with new orthopedic hardware in lumbar/sacral spine.  Patient underwent an AutoPSCT on 4/02/19. Engraftment was noted on 4/12/19. Discharged on 4/19/19. Hospital Course: Mr. Lorenzo is a 66 year old male with oligosecretory kappa light chain multiple myeloma initially diagnosed 2/2016, involving a large sacral lesion with biopsy proven plasma cell neoplasm. He underwent radiation with SBRT completed in 8/2016. A subsequent MRI showed mild progression of severe left sided foraminal narrowing at L5-S1 and an unchanged appearance of a large infiltrative mass within the sacrum.   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 In 2/2018 he had an L2 pelvis posterior spimal fusion and elected for lumbopelvic reconstruction. Pathology of the L5-S1 extradural mass was disc cartilage with mild degenerative changes. He then followed with Dr. Tan of medical oncology, and was treated with zometa and maintenance revlimid. A PET scan on 4/16/18 showed a new hypermetabolism associated with a pathologic left sacral fracture, a predominantly  non-FDG avid sacral lesion that was changed, and a nonspecific hypermetabolism associated with new orthopedic hardware in the lumbar / sacral spine. The most recent CT from 12/2018 showed disease in the pelvis related to myeloma.   Labs prior to admission showed hepatitis B core antibody positive, however hepatitis B PCR negative. His most recent SPEP, quant Ig and SFLC were unremarkable. IgG 1140.   Upon admission, a TLC was placed in IR. Mr. Lorenzo received IV hydration, pain management, anxiolysis, antiemetics, nutritional support and antibacterial / antifungal / antiviral / PCP / GI and VOD (SOS) prophylaxis. When his ANC dropped below 1000, he was started on prophylactic ciprofloxacin.   On 4/2/19, after pre-medication, Mr. Lorenzo received 313 mLs of pooled, thawed, mobilized, plasma reduced, washed apheresis HPC over 49 minutes. Cell counts as follows:   Total MNCs ( x 10^8/kg): 3.80  CD 34+Cells ( x 10^6/kg): 7.10  Cell Viability (%): 81  Mr. Lorenzo tolerated the infusion well with no adverse reactions noted.   On admission, Mr. Lorenzo had baseline mild left foot drop. He worked without physical therapy throughout his admission. Mr. Lorenzo also had diarrhea. A sample was sent for c. difficile testing, and was positive. He was treated with a course of oral vancomycin. Mr. Lorenzo also experienced pancytopenia related to the high dose chemotherapy preparative regimen.   Engraftment was noted on 4/12/19, and the ciprofloxacin and zarxio were stopped. He did experience a low grade fever during the katie-engraftment period and was observed off of antibiotics.   Currently, Mr. Lorenzo is stable for discharge home with out patient follow p at the [facility].  [de-identified] : Patient presents today s/p AutoPSCT on 4/02/19. Overall, he is doing well today. He c/o neuropathy in bilateral legs with the right leg having more neuropathy more than the left. He continues to experience a foot drop. and discomfort in left hip  .  PET/CT 9/25/2019, showed continued resolution in the sacral area, lumbar spine and in the pelvis which  indicates continued healing in the bones.  Remains compliant with medications. Denies night sweats, mouth sores, eye dryness, blurred vision, nausea, vomiting, diarrhea. No CP, SOB or LE edema..not on rev   11/30/23:  Patient is here for a follow up visit. Denies fever, chills, nausea, vomiting, diarrhea, rash, mouth sores or any signs of active bleeding. Denies SOB, chest pain or B/L LE edema. Complaints of neuropathy, hasn't improved. Patient follows up with PCP regularly, has to watch his sugar. Takes Metamucil.  3/6/24:  Patient is here for a follow up visit. Denies fever, chills, nausea, vomiting, diarrhea, rash, mouth sores or any signs of active bleeding. Denies SOB, chest pain or B/L LE edema. Complains of neuropathy.    7/25/24: Patient is here for a follow up visit. Denies fever, chills, nausea, vomiting, diarrhea, rash, mouth sores or any signs of active bleeding. Denies SOB, chest pain, or B/L LE edema. Pt states that his radiation went well and was completed on 7/22/24.   10.24.24: Patient is here for a follow up visit. Denies fever, chills, nausea, vomiting, diarrhea, rash, mouth sores or any signs of active bleeding. Denies SOB, chest pain or B/L LE edema. Completed xrt  02.12.25: Patient is here for a follow up visit. Denies fever, chills, nausea, vomiting, diarrhea, rash, mouth sores or any signs of active bleeding. Denies SOB, chest pain or B/L LE edema. did ct/pet last week  03.19.25:  Patient is here for a follow up visit. Denies fever, chills, nausea, vomiting, diarrhea, rash, mouth sores or any signs of active bleeding. Denies SOB, chest pain or B/L LE edema. He complains of an unsteady gait due to issues in his left hip. He was doing physical therapy but he did not feel it was helpful.   04.17.25: Patient is here for a follow up visit. Denies fever, chills, nausea, vomiting, diarrhea, rash, mouth sores or any signs of active bleeding. Denies SOB, chest pain or B/L LE edema.

## 2025-04-18 NOTE — PHYSICAL EXAM
[Restricted in physically strenuous activity but ambulatory and able to carry out work of a light or sedentary nature] : Status 1- Restricted in physically strenuous activity but ambulatory and able to carry out work of a light or sedentary nature, e.g., light house work, office work [Normal] : affect appropriate [de-identified] : lungs clear to auscultation bilaterally upon exam  [de-identified] : generalized hyperpigmentation improved

## 2025-04-18 NOTE — REVIEW OF SYSTEMS
[Joint Pain] : joint pain [Negative] : Allergic/Immunologic [Fatigue] : no fatigue [Cough] : no cough [FreeTextEntry9] : occ muscle cramping [de-identified] : stable neuropathy and numbness in feet, foot drop continues

## 2025-04-18 NOTE — HISTORY OF PRESENT ILLNESS
[de-identified] : Mr. Lorenzo is a 74 year old man with multiple myeloma.  He was diagnosed in February 2016 when he had back pain. He was eventually noted in May 2016 to have a large sacral lesion with biopsy demonstrating plasma cell neoplasm. He underwent 20 Gy in 5 fractions to the area in May 2016. He continued to have pain and underwent repeat radiation to 21 Gy in 3 fractions with SBRT completed in August 2016.  He last followed up with us on 11/2/17 reporting continued right lower back pain radiating to the right leg on methadone and hydromorphone. Plan was for repeat lumbosacral MRI and follow-up.  MRI Lumbar spine on 11/14/17 demonstrated mild progression of severe left sided foraminal narrowing at L5-S1 and unchanged appearance of large infiltrative mass within the sacrum. 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  In February he underwent an L2-pelvis posterior spinal fusion with Dr. Alvarez after progressive left lower extremity radiculopathy and he elected for lumbopelvic reconstruction. Pathology of the L5-S1 extradural mass was disc cartilage with mild degenerative changes. He followed up with Dr. Alvarez last on 4/30/18 doing well.  He has been following with Dr. Tan of medical oncology who has been treating her with zometa and maintenance relvimid.  His most recent PET scan on 4/16/18 demonstrates new hypermetabolism associated with pathologic left sacral fracture, a predominantly non-FDG avid sacral lesion unchanges, and nonspecific hypermetabolism associated with new orthopedic hardware in lumbar/sacral spine.  Patient underwent an AutoPSCT on 4/02/19. Engraftment was noted on 4/12/19. Discharged on 4/19/19. Hospital Course: Mr. Lorenzo is a 66 year old male with oligosecretory kappa light chain multiple myeloma initially diagnosed 2/2016, involving a large sacral lesion with biopsy proven plasma cell neoplasm. He underwent radiation with SBRT completed in 8/2016. A subsequent MRI showed mild progression of severe left sided foraminal narrowing at L5-S1 and an unchanged appearance of a large infiltrative mass within the sacrum.   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 In 2/2018 he had an L2 pelvis posterior spimal fusion and elected for lumbopelvic reconstruction. Pathology of the L5-S1 extradural mass was disc cartilage with mild degenerative changes. He then followed with Dr. Tan of medical oncology, and was treated with zometa and maintenance revlimid. A PET scan on 4/16/18 showed a new hypermetabolism associated with a pathologic left sacral fracture, a predominantly  non-FDG avid sacral lesion that was changed, and a nonspecific hypermetabolism associated with new orthopedic hardware in the lumbar / sacral spine. The most recent CT from 12/2018 showed disease in the pelvis related to myeloma.   Labs prior to admission showed hepatitis B core antibody positive, however hepatitis B PCR negative. His most recent SPEP, quant Ig and SFLC were unremarkable. IgG 1140.   Upon admission, a TLC was placed in IR. Mr. Lorenzo received IV hydration, pain management, anxiolysis, antiemetics, nutritional support and antibacterial / antifungal / antiviral / PCP / GI and VOD (SOS) prophylaxis. When his ANC dropped below 1000, he was started on prophylactic ciprofloxacin.   On 4/2/19, after pre-medication, Mr. Lorenzo received 313 mLs of pooled, thawed, mobilized, plasma reduced, washed apheresis HPC over 49 minutes. Cell counts as follows:   Total MNCs ( x 10^8/kg): 3.80  CD 34+Cells ( x 10^6/kg): 7.10  Cell Viability (%): 81  Mr. Lorenzo tolerated the infusion well with no adverse reactions noted.   On admission, Mr. Lorenzo had baseline mild left foot drop. He worked without physical therapy throughout his admission. Mr. Lorenzo also had diarrhea. A sample was sent for c. difficile testing, and was positive. He was treated with a course of oral vancomycin. Mr. Lorenzo also experienced pancytopenia related to the high dose chemotherapy preparative regimen.   Engraftment was noted on 4/12/19, and the ciprofloxacin and zarxio were stopped. He did experience a low grade fever during the katie-engraftment period and was observed off of antibiotics.   Currently, Mr. Lorenzo is stable for discharge home with out patient follow p at the [facility].  [de-identified] : Patient presents today s/p AutoPSCT on 4/02/19. Overall, he is doing well today. He c/o neuropathy in bilateral legs with the right leg having more neuropathy more than the left. He continues to experience a foot drop. and discomfort in left hip  .  PET/CT 9/25/2019, showed continued resolution in the sacral area, lumbar spine and in the pelvis which  indicates continued healing in the bones.  Remains compliant with medications. Denies night sweats, mouth sores, eye dryness, blurred vision, nausea, vomiting, diarrhea. No CP, SOB or LE edema..not on rev   11/30/23:  Patient is here for a follow up visit. Denies fever, chills, nausea, vomiting, diarrhea, rash, mouth sores or any signs of active bleeding. Denies SOB, chest pain or B/L LE edema. Complaints of neuropathy, hasn't improved. Patient follows up with PCP regularly, has to watch his sugar. Takes Metamucil.  3/6/24:  Patient is here for a follow up visit. Denies fever, chills, nausea, vomiting, diarrhea, rash, mouth sores or any signs of active bleeding. Denies SOB, chest pain or B/L LE edema. Complains of neuropathy.    7/25/24: Patient is here for a follow up visit. Denies fever, chills, nausea, vomiting, diarrhea, rash, mouth sores or any signs of active bleeding. Denies SOB, chest pain, or B/L LE edema. Pt states that his radiation went well and was completed on 7/22/24.   10.24.24: Patient is here for a follow up visit. Denies fever, chills, nausea, vomiting, diarrhea, rash, mouth sores or any signs of active bleeding. Denies SOB, chest pain or B/L LE edema. Completed xrt  02.12.25: Patient is here for a follow up visit. Denies fever, chills, nausea, vomiting, diarrhea, rash, mouth sores or any signs of active bleeding. Denies SOB, chest pain or B/L LE edema. did ct/pet last week  03.19.25:  Patient is here for a follow up visit. Denies fever, chills, nausea, vomiting, diarrhea, rash, mouth sores or any signs of active bleeding. Denies SOB, chest pain or B/L LE edema. He complains of an unsteady gait due to issues in his left hip. He was doing physical therapy but he did not feel it was helpful.   04.17.25: Patient is here for a follow up visit. Denies fever, chills, nausea, vomiting, diarrhea, rash, mouth sores or any signs of active bleeding. Denies SOB, chest pain or B/L LE edema.

## 2025-04-18 NOTE — ADDENDUM
[FreeTextEntry1] : Documented by Brown London acting as a scribe for Dr. Bhavani Mtz on 04/17/2025. All medical record entries made by the Scribe were at my, Dr. Bhavani Mtz, direction and personally dictated by me on 04/17/2025. I have reviewed the chart and agree that the record accurately reflects my personal performance of the history, physical exam, assessment and plan. I have also personally directed, reviewed, and agree with the discharge instructions.

## 2025-04-18 NOTE — REVIEW OF SYSTEMS
[Joint Pain] : joint pain [Negative] : Allergic/Immunologic [Fatigue] : no fatigue [Cough] : no cough [FreeTextEntry9] : occ muscle cramping [de-identified] : stable neuropathy and numbness in feet, foot drop continues

## 2025-04-18 NOTE — ASSESSMENT
[FreeTextEntry1] : DVT (deep venous thrombosis) (453.40) (I82.409) History of autologous stem cell transplant (V42.82) (Z94.84) Multiple myeloma (203.00) (C90.00) This is a 74 year old male with PMH of oligo-secretory kappa light chain multiple myeloma, s/p AUTO PBSCT on 4/2/19 with the following comorbidities being managed  1) MM S/p auto SCT on 4/2/19..appeared to have good disease control Repeated myeloma labs approx every 4 mos after SCT (Sept 2019) Restaging PET scan to be done approx 4 to 6 mos after SCT done Sept 2019..confirmed improvement..repeated late winter....overall good disease control..not on maintenance due to sxs...skeletal survey ordered 8/4/22..stable Repeat CT/PET due Spring / summer 2024...4/23/24 results compatible with active MM...local radiation completed 7/22/24..2/2025 ct/pet new lesion..discussed bx..confirmed plasma cells / disease f/u neuro and ortho for low back issues....??role for kyphoplasty receiving Kalli sq plus dex..consider adding pomalyst  2) Heme Counts stable, no indication for transfusion Continue multivitamin daily. Discontinue folic acid as of 7/16/19. 10/24/24: WBC 3.37, HGB 12.7,  02/12/25: WBC 3.10, HGB 12.5,  03/18/25: WBC 3.28, HGB 12.7,  04/17/25: WBC 2.72, HGB 13.1,   3) ID - Acyclovir 400mg q8hr for shingles prevention..off..restart - Mepron 750mg bid- discontinued..resume Fluconazole discontinued. Reviewed infectious risk and post-transplant crowd and dietary restrictions. All restrictions lifted on 5/31/19. Covid caution stressed  Hx of C Diff infection during transplant admission Diarrhea resolved PO Vanco 125mg decreased to bid on 5/2/19, then discontinued.  Intermittent dysuria 5/2/19 UA negative Urine cx negative  4) GI Continue Protonix 40mg daily PRN Reglan and Zofran as needed  Transaminitis First noted 4/25/19 with AST of 89 and ALT of 249 Fluconazole- d/c'd 4/26 with transaminitis Improved as of 5/2/19, continue to monitor Restarted Fluconazole....now discontinue once current supply complete. Discontinued.  Discussed re-vaccination schedule. Starting april 2020..postponed due to crisis with covid 19..plan for august 12 month vaccines (hemophilus influenza B, Prevnar 13, inactivated polio, tetanus-diphtheria) 14 month vaccines (hemophilus influenza B, inactivated polio, tetanus-diphtheria) oct 2020 24 month vaccines (hemophilus influenza B, pneumococcal vaccine, inactivated polio, tetanus-diphtheria, measles, mumps, rubella). completed  5) Plan/Dispo Reviewed infectious risk and post-transplant crowd and dietary restrictions with covid. Continue to monitor IgG levels  maintenance again discussed...  Also discussed potential treatment if POD...natural hx of MM discussed Pt educated regarding plan of care, all questions/concerns addressed. Instructed to contact our office with fever or new/worsening symptoms.  Recommend voltaren cream in area of pain on hip Well care and cancer screening stressed Chest CT mid-October 2024 with improvement in radiated lesion.... ct/pet feb 2025 new lesion..bx confirmed disease Discussed consideration of Darzalex and Revlimid  or pom if pt does not respond to local radiation and requires systemic treatment... Discussed consideration of repeat BMB  Discussed bispecific antibody treatment if the myeloma progresses... Darzalex weekly for 8 weeks, every other weeks for 8 treatments and then monthly with Dexamethasone... discussed addition of Pomalyst as well if needed  -- 04/17/25: Has 3 more weekly Darzalex appointments and then he will begin treatment every other week pt received covid vaccine X 3 moderna...and flu shot last year..flu advised today as well as covid booster Discussed his most recent PET/CT results that noted a hypermetabolic  lymph node measuring 1.7cm suv 21 in the chest that raises a concern... discussed a needle biopsy to further assess ..disease confirmed..d/w pt need for systemic therapy...will proceed with kalli dex with plan to add pom in future..consent signed..quests addressed f/u scan Aug 2025 Follow up with Dr. Mtz in 2 months

## 2025-04-18 NOTE — PHYSICAL EXAM
[Restricted in physically strenuous activity but ambulatory and able to carry out work of a light or sedentary nature] : Status 1- Restricted in physically strenuous activity but ambulatory and able to carry out work of a light or sedentary nature, e.g., light house work, office work [Normal] : affect appropriate [de-identified] : lungs clear to auscultation bilaterally upon exam  [de-identified] : generalized hyperpigmentation improved

## 2025-06-19 NOTE — HISTORY OF PRESENT ILLNESS
[FreeTextEntry1] : Mr. Lorenzo is a 75 year old man with multiple myeloma diagnosed initially in 2016 after presenting with back pain. 7/22/2024 He completed 2000 cgy in 5 fractions to the sacrum under my care at from 5/12-5/18/2016.  He continued to have pain and completed repeat RT with SBRT to the sacrum, 2100 cgy in 3 fractions on 8/4/2016. Completed RT to chest wall total dose of 2500cGy in 5/5 fractions  His HPI as I understand it is summarized belwo:  CT abdomen and pelvis done 5/3/2016 showed a large destructive sacral soft tissue mass 6.5 X 10.5 X 8cm. A sacral FNA on 5/9/2016 showed morphologic findings and presence of a bright CD38 population support a plasma cell neoplasm.   Bone marrow biopsy 5/11/2016 showed atypical plasmacytosis consistent with plasma cell neoplasm. He completed 2000 cgy in 5 fractions to the sacrum under my care at from 5/12-5/18/2016.  He continued to have pain and completed repeat RT with SBRT to the sacrum, 2100 cgy in 3 fractions on 8/4/2016 He is s/p L2-pelvis posterior spinal fusion with Dr. Alvarez after progressive LLE radiculopathy. Path from L5-s1 extradural mass showed disc cartilage with mild degenerative changes.  Initially treated with Dr. Tan with revlemid starting 12/2016. Treated with Auto PSCT on 4/2/2019 with engraftment on 4/12/2019.  PET scan done 4/23/2024 showed: 1. Since the PET/CT in 9/2019; new FDG avid lytic lesion in the right 5th rib with soft tissue component compatible with active multiple myeloma. 2. Relatively stable chronic bone changes in the sacrum and adjacent bilateral iliac bones with nonspecific patches of activity most likely due to continuing bone remodeling.  Pathology from 5/22/2024 FNA of the right rib showed plasma cell neoplasm. Note: The current biopsy of the right rib lesion shows involvement by plasma cell neoplasm and is consistent with relapse. Suggest clinical correlation.  7/22/2024 Completed RT to chest wall total dose of 2500cGy in 5/5 fractions  9/19/24 CT Chest IMPRESSION: Right anterior fifth rib lytic lesion with soft tissue component, decreased since 4/23/2024. Stable rounded lucent lesions throughout the thoracic spine. Stable bilateral pulmonary nodules.  1/02/25 CT Chest IMPRESSION: Lucent lesion within the right fifth rib with slight interval decrease in size of its internal soft tissue component since September 19, 2024. 1.4 cm enlarged right internal mammary chain lymph node with significant increase in size since September 19, 2024. 1 cm anterior mediastinal soft tissue nodule with interval increase in size since September 19, 2024. Findings are likely due to progression of neoplasm. PET CT can be performed for further evaluation. Small patchy opacity within the right lower lobe new since September 19, 2024, may represent subsegmental atelectasis or be sequela of the given history of radiation therapy however, follow-up to resolution is recommended. Cluster of a few adjacent subcentimeter groundglass opacities within the right upper lobe new since September 19, 2024, likely of impacted distal airways may represent tiny foci of infection/inflammation. These can be monitored on the follow-up chest CTs.   2/06/25 PET/CT  IMPRESSION: Compared to whole-body FDG PET CT dated 4/23/2024: 1. New markedly FDG-avid enlarged lymph nodes in the right anterior mediastinal/retrosternal region and right internal mammary region at the level of the second rib, similar to CT dated 1/2/2025, are suspicious for malignancy. The internal mammary lymph node is amenable to an ultrasound-guided percutaneous needle biopsy. 2. Near total resolution of hypermetabolism associated with a lytic lesion in the anterior aspect of the right fifth rib which demonstrates resolution of associated soft tissue component, compatible with response to interval therapy. 3. 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 4. New mildly FDG-avid patchy right lower lobe pulmonary opacity, similar in appearance as compared to CT dated 1/2/2025, may be related to interval radiation therapy. A three-month follow-up with CT chest may be obtained for further evaluation.  2/26/35: Internal mammary LN FNA- positive for malignant cells, plasma cell neoplasm  6/16/25 Presents today for follow up. Denies fever, chills, nausea, vomiting, diarrhea, rash, mouth sores or any signs of active bleeding. Denies SOB, chest pain or B/L LE edema.  Continues follow up with Med/Onc Dr. Mtz on 4/17/25 as per note will proceed with Darzalex weekly for 8 weeks, every other week for 8 treatments and then monthly with Dexamethasone with plan to add Pomalyst in future.

## 2025-06-23 NOTE — PHYSICAL EXAM
[Restricted in physically strenuous activity but ambulatory and able to carry out work of a light or sedentary nature] : Status 1- Restricted in physically strenuous activity but ambulatory and able to carry out work of a light or sedentary nature, e.g., light house work, office work [Normal] : affect appropriate [de-identified] : lungs clear to auscultation bilaterally upon exam  [de-identified] : generalized hyperpigmentation improved

## 2025-06-23 NOTE — REVIEW OF SYSTEMS
[Joint Pain] : joint pain [Negative] : Allergic/Immunologic [Fatigue] : no fatigue [Cough] : no cough [FreeTextEntry9] : occ muscle cramping [de-identified] : stable neuropathy and numbness in feet, foot drop continues

## 2025-06-23 NOTE — HISTORY OF PRESENT ILLNESS
[de-identified] : Mr. Lorenzo is a 74 year old man with multiple myeloma.  He was diagnosed in February 2016 when he had back pain. He was eventually noted in May 2016 to have a large sacral lesion with biopsy demonstrating plasma cell neoplasm. He underwent 20 Gy in 5 fractions to the area in May 2016. He continued to have pain and underwent repeat radiation to 21 Gy in 3 fractions with SBRT completed in August 2016.  He last followed up with us on 11/2/17 reporting continued right lower back pain radiating to the right leg on methadone and hydromorphone. Plan was for repeat lumbosacral MRI and follow-up.  MRI Lumbar spine on 11/14/17 demonstrated mild progression of severe left sided foraminal narrowing at L5-S1 and unchanged appearance of large infiltrative mass within the sacrum. 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  In February he underwent an L2-pelvis posterior spinal fusion with Dr. Alvarez after progressive left lower extremity radiculopathy and he elected for lumbopelvic reconstruction. Pathology of the L5-S1 extradural mass was disc cartilage with mild degenerative changes. He followed up with Dr. Alvarez last on 4/30/18 doing well.  He has been following with Dr. Tan of medical oncology who has been treating her with zometa and maintenance relvimid.  His most recent PET scan on 4/16/18 demonstrates new hypermetabolism associated with pathologic left sacral fracture, a predominantly non-FDG avid sacral lesion unchanges, and nonspecific hypermetabolism associated with new orthopedic hardware in lumbar/sacral spine.  Patient underwent an AutoPSCT on 4/02/19. Engraftment was noted on 4/12/19. Discharged on 4/19/19. Hospital Course: Mr. Lorenzo is a 66 year old male with oligosecretory kappa light chain multiple myeloma initially diagnosed 2/2016, involving a large sacral lesion with biopsy proven plasma cell neoplasm. He underwent radiation with SBRT completed in 8/2016. A subsequent MRI showed mild progression of severe left sided foraminal narrowing at L5-S1 and an unchanged appearance of a large infiltrative mass within the sacrum.   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 In 2/2018 he had an L2 pelvis posterior spimal fusion and elected for lumbopelvic reconstruction. Pathology of the L5-S1 extradural mass was disc cartilage with mild degenerative changes. He then followed with Dr. Tan of medical oncology, and was treated with zometa and maintenance revlimid. A PET scan on 4/16/18 showed a new hypermetabolism associated with a pathologic left sacral fracture, a predominantly  non-FDG avid sacral lesion that was changed, and a nonspecific hypermetabolism associated with new orthopedic hardware in the lumbar / sacral spine. The most recent CT from 12/2018 showed disease in the pelvis related to myeloma.   Labs prior to admission showed hepatitis B core antibody positive, however hepatitis B PCR negative. His most recent SPEP, quant Ig and SFLC were unremarkable. IgG 1140.   Upon admission, a TLC was placed in IR. Mr. Lorenzo received IV hydration, pain management, anxiolysis, antiemetics, nutritional support and antibacterial / antifungal / antiviral / PCP / GI and VOD (SOS) prophylaxis. When his ANC dropped below 1000, he was started on prophylactic ciprofloxacin.   On 4/2/19, after pre-medication, Mr. Lorenzo received 313 mLs of pooled, thawed, mobilized, plasma reduced, washed apheresis HPC over 49 minutes. Cell counts as follows:   Total MNCs ( x 10^8/kg): 3.80  CD 34+Cells ( x 10^6/kg): 7.10  Cell Viability (%): 81  Mr. Lorenzo tolerated the infusion well with no adverse reactions noted.   On admission, Mr. Lorenzo had baseline mild left foot drop. He worked without physical therapy throughout his admission. Mr. Lorenzo also had diarrhea. A sample was sent for c. difficile testing, and was positive. He was treated with a course of oral vancomycin. Mr. Lorenzo also experienced pancytopenia related to the high dose chemotherapy preparative regimen.   Engraftment was noted on 4/12/19, and the ciprofloxacin and zarxio were stopped. He did experience a low grade fever during the katie-engraftment period and was observed off of antibiotics.   Currently, Mr. Lorenzo is stable for discharge home with out patient follow p at the [facility].  [de-identified] : Patient presents today s/p AutoPSCT on 4/02/19. Overall, he is doing well today. He c/o neuropathy in bilateral legs with the right leg having more neuropathy more than the left. He continues to experience a foot drop. and discomfort in left hip  .  PET/CT 9/25/2019, showed continued resolution in the sacral area, lumbar spine and in the pelvis which  indicates continued healing in the bones.  Remains compliant with medications. Denies night sweats, mouth sores, eye dryness, blurred vision, nausea, vomiting, diarrhea. No CP, SOB or LE edema..not on rev   11/30/23:  Patient is here for a follow up visit. Denies fever, chills, nausea, vomiting, diarrhea, rash, mouth sores or any signs of active bleeding. Denies SOB, chest pain or B/L LE edema. Complaints of neuropathy, hasn't improved. Patient follows up with PCP regularly, has to watch his sugar. Takes Metamucil.  3/6/24:  Patient is here for a follow up visit. Denies fever, chills, nausea, vomiting, diarrhea, rash, mouth sores or any signs of active bleeding. Denies SOB, chest pain or B/L LE edema. Complains of neuropathy.    7/25/24: Patient is here for a follow up visit. Denies fever, chills, nausea, vomiting, diarrhea, rash, mouth sores or any signs of active bleeding. Denies SOB, chest pain, or B/L LE edema. Pt states that his radiation went well and was completed on 7/22/24.   10.24.24: Patient is here for a follow up visit. Denies fever, chills, nausea, vomiting, diarrhea, rash, mouth sores or any signs of active bleeding. Denies SOB, chest pain or B/L LE edema. Completed xrt  02.12.25: Patient is here for a follow up visit. Denies fever, chills, nausea, vomiting, diarrhea, rash, mouth sores or any signs of active bleeding. Denies SOB, chest pain or B/L LE edema. did ct/pet last week  03.19.25:  Patient is here for a follow up visit. Denies fever, chills, nausea, vomiting, diarrhea, rash, mouth sores or any signs of active bleeding. Denies SOB, chest pain or B/L LE edema. He complains of an unsteady gait due to issues in his left hip. He was doing physical therapy but he did not feel it was helpful.   04.17.25: Patient is here for a follow up visit. Denies fever, chills, nausea, vomiting, diarrhea, rash, mouth sores or any signs of active bleeding. Denies SOB, chest pain or B/L LE edema.   06.19.25: Patient is here for a follow up visit via telehealth..consents verbally. Denies fever, chills, nausea, vomiting, diarrhea, rash, mouth sores or any signs of active bleeding. Denies SOB, chest pain or B/L LE edema. tolerating bea sq

## 2025-06-23 NOTE — PHYSICAL EXAM
[Restricted in physically strenuous activity but ambulatory and able to carry out work of a light or sedentary nature] : Status 1- Restricted in physically strenuous activity but ambulatory and able to carry out work of a light or sedentary nature, e.g., light house work, office work [Normal] : affect appropriate [de-identified] : lungs clear to auscultation bilaterally upon exam  [de-identified] : generalized hyperpigmentation improved

## 2025-06-23 NOTE — REVIEW OF SYSTEMS
[Joint Pain] : joint pain [Negative] : Allergic/Immunologic [Fatigue] : no fatigue [Cough] : no cough [FreeTextEntry9] : occ muscle cramping [de-identified] : stable neuropathy and numbness in feet, foot drop continues

## 2025-06-23 NOTE — ASSESSMENT
[FreeTextEntry1] : DVT (deep venous thrombosis) (453.40) (I82.409) History of autologous stem cell transplant (V42.82) (Z94.84) Multiple myeloma (203.00) (C90.00) This is a 75 year old male with PMH of oligo-secretory kappa light chain multiple myeloma, s/p AUTO PBSCT on 4/2/19 with the following comorbidities being managed  1) MM S/p auto SCT on 4/2/19..appeared to have good disease control Repeated myeloma labs approx every 4 mos after SCT (Sept 2019) Restaging PET scan to be done approx 4 to 6 mos after SCT done Sept 2019..confirmed improvement..repeated late winter....overall good disease control..not on maintenance due to sxs...skeletal survey ordered 8/4/22..stable Repeat CT/PET due Spring / summer 2024...4/23/24 results compatible with active MM...local radiation completed 7/22/24..2/2025 ct/pet new lesion..discussed bx..confirmed plasma cells / disease f/u neuro and ortho for low back issues....??role for kyphoplasty receiving Kalli sq plus dex..consider adding pomalyst if not responding..at this time pt is responding well with normalization of free light chains  2) Heme Counts stable, no indication for transfusion Continue multivitamin daily. Discontinue folic acid as of 7/16/19. 10/24/24: WBC 3.37, HGB 12.7,  02/12/25: WBC 3.10, HGB 12.5,  03/18/25: WBC 3.28, HGB 12.7,  04/17/25: WBC 2.72, HGB 13.1,  06/19/25: WBC 4.86, HGB 13,    3) ID - Acyclovir 400mg q8hr for shingles prevention..off..restarted - Mepron 750mg bid- discontinued..resume Fluconazole discontinued. Reviewed infectious risk and post-transplant crowd and dietary restrictions. All restrictions lifted on 5/31/19. Covid caution stressed  Hx of C Diff infection during transplant admission Diarrhea resolved PO Vanco 125mg decreased to bid on 5/2/19, then discontinued.  Intermittent dysuria 5/2/19 UA negative Urine cx negative  4) GI Continue Protonix 40mg daily PRN Reglan and Zofran as needed  Transaminitis First noted 4/25/19 with AST of 89 and ALT of 249 Fluconazole- d/c'd 4/26 with transaminitis Improved as of 5/2/19, continue to monitor Restarted Fluconazole....now discontinue once current supply complete. Discontinued.  Discussed re-vaccination schedule. Starting april 2020..postponed due to crisis with covid 19..plan for august 12 month vaccines (hemophilus influenza B, Prevnar 13, inactivated polio, tetanus-diphtheria) 14 month vaccines (hemophilus influenza B, inactivated polio, tetanus-diphtheria) oct 2020 24 month vaccines (hemophilus influenza B, pneumococcal vaccine, inactivated polio, tetanus-diphtheria, measles, mumps, rubella). completed  5) Plan/Dispo Reviewed infectious risk and post-transplant crowd and dietary restrictions with covid. Continue to monitor IgG levels  maintenance again discussed...sq kalli  Also discussed potential treatment if POD...natural hx of MM discussed Pt educated regarding plan of care, all questions/concerns addressed. Instructed to contact our office with fever or new/worsening symptoms.  Recommend voltaren cream in area of pain on hip Well care and cancer screening stressed Chest CT mid-October 2024 with improvement in radiated lesion.... ct/pet feb 2025 new lesion..bx confirmed disease Discussed consideration of Darzalex and Revlimid  or pom if pt does not respond to local radiation and requires systemic treatment... Discussed consideration of repeat BMB  Discussed bispecific antibody treatment if the myeloma progresses... Darzalex weekly for 8 weeks, every other weeks for 8 treatments and then monthly with Dexamethasone... discussed addition of Pomalyst as well if needed  -- 04/17/25: Has 3 more weekly Darzalex appointments and then he will begin treatment every other week pt received covid vaccine X 3 moderna...and flu shot last year..flu advised today as well as covid booster Discussed his most recent PET/CT results that noted a hypermetabolic  lymph node measuring 1.7cm suv 21 in the chest that raises a concern... discussed a needle biopsy to further assess ..disease confirmed..d/w pt need for systemic therapy...will proceed with kalli dex with plan to add pom in future if not responding..verbal consentfor tele visit..time 12 minutes..quests addressed f/u whole body scan Aug / sept  2025 ct chest ordered now to f/u post treratment  Follow up with Dr. Mtz in 2 months

## 2025-06-23 NOTE — ADDENDUM
[FreeTextEntry1] : Documented by Brown London acting as a scribe for Dr. Bhavani Mtz on 06/19/2025. All medical record entries made by the Scribe were at my, Dr. Bhavani Mtz, direction and personally dictated by me on 06/19/2025. I have reviewed the chart and agree that the record accurately reflects my personal performance of the history, physical exam, assessment and plan. I have also personally directed, reviewed, and agree with the discharge instructions.

## 2025-06-23 NOTE — HISTORY OF PRESENT ILLNESS
[de-identified] : Mr. Lorenzo is a 74 year old man with multiple myeloma.  He was diagnosed in February 2016 when he had back pain. He was eventually noted in May 2016 to have a large sacral lesion with biopsy demonstrating plasma cell neoplasm. He underwent 20 Gy in 5 fractions to the area in May 2016. He continued to have pain and underwent repeat radiation to 21 Gy in 3 fractions with SBRT completed in August 2016.  He last followed up with us on 11/2/17 reporting continued right lower back pain radiating to the right leg on methadone and hydromorphone. Plan was for repeat lumbosacral MRI and follow-up.  MRI Lumbar spine on 11/14/17 demonstrated mild progression of severe left sided foraminal narrowing at L5-S1 and unchanged appearance of large infiltrative mass within the sacrum. 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  In February he underwent an L2-pelvis posterior spinal fusion with Dr. Alvarez after progressive left lower extremity radiculopathy and he elected for lumbopelvic reconstruction. Pathology of the L5-S1 extradural mass was disc cartilage with mild degenerative changes. He followed up with Dr. Alvarez last on 4/30/18 doing well.  He has been following with Dr. Tan of medical oncology who has been treating her with zometa and maintenance relvimid.  His most recent PET scan on 4/16/18 demonstrates new hypermetabolism associated with pathologic left sacral fracture, a predominantly non-FDG avid sacral lesion unchanges, and nonspecific hypermetabolism associated with new orthopedic hardware in lumbar/sacral spine.  Patient underwent an AutoPSCT on 4/02/19. Engraftment was noted on 4/12/19. Discharged on 4/19/19. Hospital Course: Mr. Lorenzo is a 66 year old male with oligosecretory kappa light chain multiple myeloma initially diagnosed 2/2016, involving a large sacral lesion with biopsy proven plasma cell neoplasm. He underwent radiation with SBRT completed in 8/2016. A subsequent MRI showed mild progression of severe left sided foraminal narrowing at L5-S1 and an unchanged appearance of a large infiltrative mass within the sacrum.   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 In 2/2018 he had an L2 pelvis posterior spimal fusion and elected for lumbopelvic reconstruction. Pathology of the L5-S1 extradural mass was disc cartilage with mild degenerative changes. He then followed with Dr. Tan of medical oncology, and was treated with zometa and maintenance revlimid. A PET scan on 4/16/18 showed a new hypermetabolism associated with a pathologic left sacral fracture, a predominantly  non-FDG avid sacral lesion that was changed, and a nonspecific hypermetabolism associated with new orthopedic hardware in the lumbar / sacral spine. The most recent CT from 12/2018 showed disease in the pelvis related to myeloma.   Labs prior to admission showed hepatitis B core antibody positive, however hepatitis B PCR negative. His most recent SPEP, quant Ig and SFLC were unremarkable. IgG 1140.   Upon admission, a TLC was placed in IR. Mr. Lorenzo received IV hydration, pain management, anxiolysis, antiemetics, nutritional support and antibacterial / antifungal / antiviral / PCP / GI and VOD (SOS) prophylaxis. When his ANC dropped below 1000, he was started on prophylactic ciprofloxacin.   On 4/2/19, after pre-medication, Mr. Lorenzo received 313 mLs of pooled, thawed, mobilized, plasma reduced, washed apheresis HPC over 49 minutes. Cell counts as follows:   Total MNCs ( x 10^8/kg): 3.80  CD 34+Cells ( x 10^6/kg): 7.10  Cell Viability (%): 81  Mr. Lorenzo tolerated the infusion well with no adverse reactions noted.   On admission, Mr. Lorenzo had baseline mild left foot drop. He worked without physical therapy throughout his admission. Mr. Lorenzo also had diarrhea. A sample was sent for c. difficile testing, and was positive. He was treated with a course of oral vancomycin. Mr. Lorenzo also experienced pancytopenia related to the high dose chemotherapy preparative regimen.   Engraftment was noted on 4/12/19, and the ciprofloxacin and zarxio were stopped. He did experience a low grade fever during the katie-engraftment period and was observed off of antibiotics.   Currently, Mr. Lorenzo is stable for discharge home with out patient follow p at the [facility].  [de-identified] : Patient presents today s/p AutoPSCT on 4/02/19. Overall, he is doing well today. He c/o neuropathy in bilateral legs with the right leg having more neuropathy more than the left. He continues to experience a foot drop. and discomfort in left hip  .  PET/CT 9/25/2019, showed continued resolution in the sacral area, lumbar spine and in the pelvis which  indicates continued healing in the bones.  Remains compliant with medications. Denies night sweats, mouth sores, eye dryness, blurred vision, nausea, vomiting, diarrhea. No CP, SOB or LE edema..not on rev   11/30/23:  Patient is here for a follow up visit. Denies fever, chills, nausea, vomiting, diarrhea, rash, mouth sores or any signs of active bleeding. Denies SOB, chest pain or B/L LE edema. Complaints of neuropathy, hasn't improved. Patient follows up with PCP regularly, has to watch his sugar. Takes Metamucil.  3/6/24:  Patient is here for a follow up visit. Denies fever, chills, nausea, vomiting, diarrhea, rash, mouth sores or any signs of active bleeding. Denies SOB, chest pain or B/L LE edema. Complains of neuropathy.    7/25/24: Patient is here for a follow up visit. Denies fever, chills, nausea, vomiting, diarrhea, rash, mouth sores or any signs of active bleeding. Denies SOB, chest pain, or B/L LE edema. Pt states that his radiation went well and was completed on 7/22/24.   10.24.24: Patient is here for a follow up visit. Denies fever, chills, nausea, vomiting, diarrhea, rash, mouth sores or any signs of active bleeding. Denies SOB, chest pain or B/L LE edema. Completed xrt  02.12.25: Patient is here for a follow up visit. Denies fever, chills, nausea, vomiting, diarrhea, rash, mouth sores or any signs of active bleeding. Denies SOB, chest pain or B/L LE edema. did ct/pet last week  03.19.25:  Patient is here for a follow up visit. Denies fever, chills, nausea, vomiting, diarrhea, rash, mouth sores or any signs of active bleeding. Denies SOB, chest pain or B/L LE edema. He complains of an unsteady gait due to issues in his left hip. He was doing physical therapy but he did not feel it was helpful.   04.17.25: Patient is here for a follow up visit. Denies fever, chills, nausea, vomiting, diarrhea, rash, mouth sores or any signs of active bleeding. Denies SOB, chest pain or B/L LE edema.   06.19.25: Patient is here for a follow up visit via telehealth..consents verbally. Denies fever, chills, nausea, vomiting, diarrhea, rash, mouth sores or any signs of active bleeding. Denies SOB, chest pain or B/L LE edema. tolerating bea sq